# Patient Record
Sex: FEMALE | Race: WHITE | NOT HISPANIC OR LATINO | Employment: FULL TIME | URBAN - METROPOLITAN AREA
[De-identification: names, ages, dates, MRNs, and addresses within clinical notes are randomized per-mention and may not be internally consistent; named-entity substitution may affect disease eponyms.]

---

## 2021-08-17 ENCOUNTER — OFFICE VISIT (OUTPATIENT)
Dept: FAMILY MEDICINE CLINIC | Facility: CLINIC | Age: 31
End: 2021-08-17

## 2021-08-17 VITALS
BODY MASS INDEX: 45.99 KG/M2 | OXYGEN SATURATION: 96 % | HEIGHT: 67 IN | SYSTOLIC BLOOD PRESSURE: 136 MMHG | WEIGHT: 293 LBS | DIASTOLIC BLOOD PRESSURE: 74 MMHG | HEART RATE: 74 BPM | RESPIRATION RATE: 16 BRPM

## 2021-08-17 DIAGNOSIS — IMO0001 HORMONAL IMBALANCE IN TRANSGENDER PATIENT: Primary | ICD-10-CM

## 2021-08-17 PROBLEM — E66.813 CLASS 3 SEVERE OBESITY DUE TO EXCESS CALORIES WITHOUT SERIOUS COMORBIDITY IN ADULT: Status: ACTIVE | Noted: 2021-08-17

## 2021-08-17 PROBLEM — F51.4 SLEEP TERROR DISORDER: Status: ACTIVE | Noted: 2021-08-17

## 2021-08-17 PROBLEM — J30.9 ALLERGIC RHINITIS: Status: ACTIVE | Noted: 2021-08-17

## 2021-08-17 PROBLEM — E66.01 MORBID OBESITY: Status: ACTIVE | Noted: 2021-08-17

## 2021-08-17 PROBLEM — F43.10 POSTTRAUMATIC STRESS DISORDER: Status: ACTIVE | Noted: 2021-08-17

## 2021-08-17 PROBLEM — J30.1 SEASONAL ALLERGIC RHINITIS DUE TO POLLEN: Status: ACTIVE | Noted: 2021-08-17

## 2021-08-17 PROBLEM — F33.1 MAJOR DEPRESSIVE DISORDER, RECURRENT EPISODE, MODERATE DEGREE: Status: ACTIVE | Noted: 2021-08-17

## 2021-08-17 PROBLEM — F41.1 GENERALIZED ANXIETY DISORDER: Status: ACTIVE | Noted: 2021-08-17

## 2021-08-17 PROBLEM — J45.909 ASTHMA: Status: ACTIVE | Noted: 2021-08-17

## 2021-08-17 PROCEDURE — 99203 OFFICE O/P NEW LOW 30 MIN: CPT | Performed by: FAMILY MEDICINE

## 2021-08-17 RX ORDER — ALBUTEROL SULFATE 2.5 MG/3ML
SOLUTION RESPIRATORY (INHALATION)
COMMUNITY
Start: 2021-05-13 | End: 2022-06-23

## 2021-08-17 RX ORDER — HYDROCODONE BITARTRATE AND ACETAMINOPHEN 5; 325 MG/1; MG/1
TABLET ORAL
COMMUNITY
Start: 2021-07-07 | End: 2021-08-17

## 2021-08-17 RX ORDER — GUAIFENESIN 600 MG/1
TABLET, EXTENDED RELEASE ORAL
COMMUNITY
Start: 2021-05-10 | End: 2022-02-22

## 2021-08-17 RX ORDER — TRAZODONE HYDROCHLORIDE 150 MG/1
TABLET ORAL
COMMUNITY
Start: 2021-04-23 | End: 2021-11-17

## 2021-08-17 RX ORDER — AZITHROMYCIN 250 MG/1
TABLET, FILM COATED ORAL
COMMUNITY
Start: 2021-05-27 | End: 2021-08-17

## 2021-08-17 RX ORDER — BUPROPION HYDROCHLORIDE 150 MG/1
TABLET ORAL
COMMUNITY
Start: 2021-05-10 | End: 2021-11-17

## 2021-08-17 RX ORDER — METHYLPREDNISOLONE 4 MG/1
TABLET ORAL
COMMUNITY
Start: 2021-05-10 | End: 2021-08-17

## 2021-08-17 RX ORDER — TESTOSTERONE CYPIONATE 200 MG/ML
INJECTION, SOLUTION INTRAMUSCULAR
Qty: 10 ML | Refills: 1 | Status: SHIPPED | OUTPATIENT
Start: 2021-08-17 | End: 2021-08-20 | Stop reason: SDUPTHER

## 2021-08-17 RX ORDER — PREDNISONE 1 MG/1
TABLET ORAL
COMMUNITY
Start: 2021-07-21 | End: 2021-08-17

## 2021-08-17 RX ORDER — TIOTROPIUM BROMIDE INHALATION SPRAY 3.12 UG/1
SPRAY, METERED RESPIRATORY (INHALATION)
COMMUNITY
Start: 2021-05-27 | End: 2021-08-17

## 2021-08-17 RX ORDER — PRAZOSIN HYDROCHLORIDE 2 MG/1
CAPSULE ORAL
COMMUNITY
Start: 2021-07-21 | End: 2021-11-17

## 2021-08-17 RX ORDER — NORETHINDRONE ACETATE AND ETHINYL ESTRADIOL, ETHINYL ESTRADIOL AND FERROUS FUMARATE 1MG-10(24)
KIT ORAL
COMMUNITY
Start: 2021-07-26

## 2021-08-17 RX ORDER — MELATONIN 10 MG
10 CAPSULE ORAL
COMMUNITY
Start: 2021-04-23

## 2021-08-17 RX ORDER — SERTRALINE HYDROCHLORIDE 100 MG/1
TABLET, FILM COATED ORAL
COMMUNITY
Start: 2021-05-13 | End: 2021-11-17 | Stop reason: SDUPTHER

## 2021-08-17 RX ORDER — HYDROXYZINE 50 MG/1
TABLET, FILM COATED ORAL
COMMUNITY
Start: 2021-05-13 | End: 2021-11-17 | Stop reason: SDUPTHER

## 2021-08-20 DIAGNOSIS — IMO0001 HORMONAL IMBALANCE IN TRANSGENDER PATIENT: ICD-10-CM

## 2021-08-20 RX ORDER — TESTOSTERONE CYPIONATE 200 MG/ML
INJECTION, SOLUTION INTRAMUSCULAR
Qty: 6 ML | Refills: 0 | Status: SHIPPED | OUTPATIENT
Start: 2021-08-20 | End: 2022-01-24 | Stop reason: SDUPTHER

## 2021-09-14 ENCOUNTER — TRANSCRIBE ORDERS (OUTPATIENT)
Dept: PHYSICAL THERAPY | Facility: CLINIC | Age: 31
End: 2021-09-14

## 2021-09-14 DIAGNOSIS — M79.644 PAIN OF RIGHT THUMB: Primary | ICD-10-CM

## 2021-09-28 ENCOUNTER — TREATMENT (OUTPATIENT)
Dept: PHYSICAL THERAPY | Facility: CLINIC | Age: 31
End: 2021-09-28

## 2021-09-28 DIAGNOSIS — M79.644 THUMB PAIN, RIGHT: Primary | ICD-10-CM

## 2021-09-28 PROCEDURE — 97750 PHYSICAL PERFORMANCE TEST: CPT | Performed by: PHYSICAL THERAPIST

## 2021-11-17 ENCOUNTER — OFFICE VISIT (OUTPATIENT)
Dept: FAMILY MEDICINE CLINIC | Facility: CLINIC | Age: 31
End: 2021-11-17

## 2021-11-17 VITALS
DIASTOLIC BLOOD PRESSURE: 78 MMHG | HEIGHT: 67 IN | RESPIRATION RATE: 18 BRPM | SYSTOLIC BLOOD PRESSURE: 128 MMHG | HEART RATE: 78 BPM | WEIGHT: 293 LBS | OXYGEN SATURATION: 98 % | BODY MASS INDEX: 45.99 KG/M2

## 2021-11-17 DIAGNOSIS — IMO0001 HORMONAL IMBALANCE IN TRANSGENDER PATIENT: Primary | ICD-10-CM

## 2021-11-17 DIAGNOSIS — F41.1 GENERALIZED ANXIETY DISORDER: ICD-10-CM

## 2021-11-17 DIAGNOSIS — Z11.59 ENCOUNTER FOR HEPATITIS C SCREENING TEST FOR LOW RISK PATIENT: ICD-10-CM

## 2021-11-17 DIAGNOSIS — E66.01 CLASS 3 SEVERE OBESITY DUE TO EXCESS CALORIES WITHOUT SERIOUS COMORBIDITY WITH BODY MASS INDEX (BMI) OF 50.0 TO 59.9 IN ADULT (HCC): ICD-10-CM

## 2021-11-17 DIAGNOSIS — F33.1 MAJOR DEPRESSIVE DISORDER, RECURRENT EPISODE, MODERATE DEGREE (HCC): ICD-10-CM

## 2021-11-17 DIAGNOSIS — Z51.81 THERAPEUTIC DRUG MONITORING: ICD-10-CM

## 2021-11-17 DIAGNOSIS — F64.9 GENDER DYSPHORIA: ICD-10-CM

## 2021-11-17 PROBLEM — J30.9 ALLERGIC RHINITIS: Status: ACTIVE | Noted: 2021-10-12

## 2021-11-17 PROBLEM — G44.209 TENSION-TYPE HEADACHE: Status: ACTIVE | Noted: 2021-11-17

## 2021-11-17 PROCEDURE — 99214 OFFICE O/P EST MOD 30 MIN: CPT | Performed by: FAMILY MEDICINE

## 2021-11-17 RX ORDER — PRAZOSIN HYDROCHLORIDE 2 MG/1
2 CAPSULE ORAL NIGHTLY
Qty: 90 CAPSULE | Refills: 3 | Status: SHIPPED | OUTPATIENT
Start: 2021-11-17 | End: 2022-12-05

## 2021-11-17 RX ORDER — SERTRALINE HYDROCHLORIDE 100 MG/1
100 TABLET, FILM COATED ORAL DAILY
Qty: 90 TABLET | Refills: 3 | Status: SHIPPED | OUTPATIENT
Start: 2021-11-17 | End: 2022-09-26

## 2021-11-17 RX ORDER — TRAZODONE HYDROCHLORIDE 50 MG/1
50 TABLET ORAL NIGHTLY
Qty: 90 TABLET | Refills: 3 | Status: SHIPPED | OUTPATIENT
Start: 2021-11-17 | End: 2022-11-28

## 2021-11-17 RX ORDER — BUPROPION HYDROCHLORIDE 150 MG/1
150 TABLET ORAL EVERY MORNING
Qty: 90 TABLET | Refills: 3 | Status: SHIPPED | OUTPATIENT
Start: 2021-11-17 | End: 2022-12-05

## 2021-11-17 RX ORDER — HYDROXYZINE 50 MG/1
50 TABLET, FILM COATED ORAL EVERY 8 HOURS PRN
Qty: 90 TABLET | Refills: 3 | Status: SHIPPED | OUTPATIENT
Start: 2021-11-17

## 2021-11-18 LAB
ALBUMIN SERPL-MCNC: 4.3 G/DL (ref 3.8–4.8)
ALBUMIN/GLOB SERPL: 1.7 {RATIO} (ref 1.2–2.2)
ALP SERPL-CCNC: 73 IU/L (ref 44–121)
ALT SERPL-CCNC: 24 IU/L (ref 0–32)
AST SERPL-CCNC: 23 IU/L (ref 0–40)
BILIRUB SERPL-MCNC: <0.2 MG/DL (ref 0–1.2)
BUN SERPL-MCNC: 9 MG/DL (ref 6–20)
BUN/CREAT SERPL: 11 (ref 9–23)
CALCIUM SERPL-MCNC: 9.2 MG/DL (ref 8.7–10.2)
CHLORIDE SERPL-SCNC: 102 MMOL/L (ref 96–106)
CHOLEST SERPL-MCNC: 230 MG/DL (ref 100–199)
CO2 SERPL-SCNC: 24 MMOL/L (ref 20–29)
CREAT SERPL-MCNC: 0.81 MG/DL (ref 0.57–1)
ERYTHROCYTE [DISTWIDTH] IN BLOOD BY AUTOMATED COUNT: 12.9 % (ref 11.7–15.4)
GLOBULIN SER CALC-MCNC: 2.5 G/DL (ref 1.5–4.5)
GLUCOSE SERPL-MCNC: 132 MG/DL (ref 65–99)
HCT VFR BLD AUTO: 42 % (ref 34–46.6)
HCV AB S/CO SERPL IA: <0.1 S/CO RATIO (ref 0–0.9)
HDLC SERPL-MCNC: 31 MG/DL
HGB BLD-MCNC: 13.8 G/DL (ref 11.1–15.9)
LDLC SERPL CALC-MCNC: 156 MG/DL (ref 0–99)
LDLC/HDLC SERPL: 5 RATIO (ref 0–3.2)
MCH RBC QN AUTO: 28.6 PG (ref 26.6–33)
MCHC RBC AUTO-ENTMCNC: 32.9 G/DL (ref 31.5–35.7)
MCV RBC AUTO: 87 FL (ref 79–97)
PLATELET # BLD AUTO: 229 X10E3/UL (ref 150–450)
POTASSIUM SERPL-SCNC: 4.2 MMOL/L (ref 3.5–5.2)
PROT SERPL-MCNC: 6.8 G/DL (ref 6–8.5)
RBC # BLD AUTO: 4.82 X10E6/UL (ref 3.77–5.28)
SODIUM SERPL-SCNC: 141 MMOL/L (ref 134–144)
TESTOST SERPL-MCNC: 289 NG/DL (ref 8–60)
TRIGL SERPL-MCNC: 230 MG/DL (ref 0–149)
VLDLC SERPL CALC-MCNC: 43 MG/DL (ref 5–40)
WBC # BLD AUTO: 9.2 X10E3/UL (ref 3.4–10.8)

## 2022-01-24 DIAGNOSIS — IMO0001 HORMONAL IMBALANCE IN TRANSGENDER PATIENT: ICD-10-CM

## 2022-01-24 RX ORDER — TESTOSTERONE CYPIONATE 200 MG/ML
INJECTION, SOLUTION INTRAMUSCULAR
Qty: 6 ML | Refills: 0 | Status: SHIPPED | OUTPATIENT
Start: 2022-01-24 | End: 2022-02-22 | Stop reason: SDUPTHER

## 2022-02-22 ENCOUNTER — OFFICE VISIT (OUTPATIENT)
Dept: FAMILY MEDICINE CLINIC | Facility: CLINIC | Age: 32
End: 2022-02-22

## 2022-02-22 VITALS
SYSTOLIC BLOOD PRESSURE: 118 MMHG | BODY MASS INDEX: 45.99 KG/M2 | HEART RATE: 76 BPM | OXYGEN SATURATION: 96 % | WEIGHT: 293 LBS | HEIGHT: 67 IN | DIASTOLIC BLOOD PRESSURE: 82 MMHG

## 2022-02-22 DIAGNOSIS — IMO0001 HORMONAL IMBALANCE IN TRANSGENDER PATIENT: Primary | ICD-10-CM

## 2022-02-22 DIAGNOSIS — F64.9 GENDER DYSPHORIA: ICD-10-CM

## 2022-02-22 DIAGNOSIS — J01.90 ACUTE VIRAL SINUSITIS: ICD-10-CM

## 2022-02-22 DIAGNOSIS — B97.89 ACUTE VIRAL SINUSITIS: ICD-10-CM

## 2022-02-22 PROCEDURE — 99214 OFFICE O/P EST MOD 30 MIN: CPT | Performed by: FAMILY MEDICINE

## 2022-02-22 RX ORDER — OXYMETAZOLINE HYDROCHLORIDE 0.05 G/100ML
2 SPRAY NASAL 2 TIMES DAILY
Qty: 30 ML | Refills: 0 | Status: SHIPPED | OUTPATIENT
Start: 2022-02-22

## 2022-02-22 RX ORDER — PSEUDOEPHEDRINE HYDROCHLORIDE 60 MG/1
60 TABLET, FILM COATED ORAL EVERY 4 HOURS PRN
Qty: 30 TABLET | Refills: 0 | Status: SHIPPED | OUTPATIENT
Start: 2022-02-22

## 2022-02-22 RX ORDER — TESTOSTERONE CYPIONATE 200 MG/ML
INJECTION, SOLUTION INTRAMUSCULAR
Qty: 6 ML | Refills: 2 | Status: SHIPPED | OUTPATIENT
Start: 2022-02-22 | End: 2022-05-24 | Stop reason: SDUPTHER

## 2022-02-23 LAB
ALBUMIN SERPL-MCNC: 4.3 G/DL (ref 3.8–4.8)
ALBUMIN/GLOB SERPL: 1.5 {RATIO} (ref 1.2–2.2)
ALP SERPL-CCNC: 76 IU/L (ref 44–121)
ALT SERPL-CCNC: 25 IU/L (ref 0–32)
AST SERPL-CCNC: 22 IU/L (ref 0–40)
BILIRUB SERPL-MCNC: <0.2 MG/DL (ref 0–1.2)
BUN SERPL-MCNC: 13 MG/DL (ref 6–20)
BUN/CREAT SERPL: 15 (ref 9–23)
CALCIUM SERPL-MCNC: 9.1 MG/DL (ref 8.7–10.2)
CHLORIDE SERPL-SCNC: 103 MMOL/L (ref 96–106)
CHOLEST SERPL-MCNC: 220 MG/DL (ref 100–199)
CO2 SERPL-SCNC: 22 MMOL/L (ref 20–29)
CREAT SERPL-MCNC: 0.84 MG/DL (ref 0.57–1)
ERYTHROCYTE [DISTWIDTH] IN BLOOD BY AUTOMATED COUNT: 13.5 % (ref 11.7–15.4)
GLOBULIN SER CALC-MCNC: 2.8 G/DL (ref 1.5–4.5)
GLUCOSE SERPL-MCNC: 169 MG/DL (ref 65–99)
HCT VFR BLD AUTO: 43.4 % (ref 34–46.6)
HDLC SERPL-MCNC: 35 MG/DL
HGB BLD-MCNC: 14.2 G/DL (ref 11.1–15.9)
LDLC SERPL CALC-MCNC: 143 MG/DL (ref 0–99)
LDLC/HDLC SERPL: 4.1 RATIO (ref 0–3.2)
MCH RBC QN AUTO: 28.5 PG (ref 26.6–33)
MCHC RBC AUTO-ENTMCNC: 32.7 G/DL (ref 31.5–35.7)
MCV RBC AUTO: 87 FL (ref 79–97)
PLATELET # BLD AUTO: 205 X10E3/UL (ref 150–450)
POTASSIUM SERPL-SCNC: 4.4 MMOL/L (ref 3.5–5.2)
PROT SERPL-MCNC: 7.1 G/DL (ref 6–8.5)
RBC # BLD AUTO: 4.98 X10E6/UL (ref 3.77–5.28)
SODIUM SERPL-SCNC: 140 MMOL/L (ref 134–144)
TESTOST SERPL-MCNC: 220 NG/DL (ref 8–60)
TRIGL SERPL-MCNC: 228 MG/DL (ref 0–149)
VLDLC SERPL CALC-MCNC: 42 MG/DL (ref 5–40)
WBC # BLD AUTO: 11.8 X10E3/UL (ref 3.4–10.8)

## 2022-05-24 DIAGNOSIS — E34.9 HORMONE IMBALANCE: Primary | ICD-10-CM

## 2022-05-24 DIAGNOSIS — F64.0 GENDER DYSPHORIA IN ADULT: ICD-10-CM

## 2022-05-24 RX ORDER — TESTOSTERONE CYPIONATE 200 MG/ML
INJECTION, SOLUTION INTRAMUSCULAR
Qty: 6 ML | Refills: 2 | Status: SHIPPED | OUTPATIENT
Start: 2022-05-24 | End: 2022-08-30 | Stop reason: SDUPTHER

## 2022-06-23 DIAGNOSIS — J45.20 MILD INTERMITTENT ASTHMA WITHOUT COMPLICATION: Primary | ICD-10-CM

## 2022-06-23 RX ORDER — ALBUTEROL SULFATE 90 UG/1
2 AEROSOL, METERED RESPIRATORY (INHALATION) EVERY 4 HOURS PRN
Qty: 18 G | Refills: 0 | Status: SHIPPED | OUTPATIENT
Start: 2022-06-23

## 2022-08-30 ENCOUNTER — OFFICE VISIT (OUTPATIENT)
Dept: FAMILY MEDICINE CLINIC | Facility: CLINIC | Age: 32
End: 2022-08-30

## 2022-08-30 VITALS
OXYGEN SATURATION: 96 % | DIASTOLIC BLOOD PRESSURE: 80 MMHG | RESPIRATION RATE: 20 BRPM | SYSTOLIC BLOOD PRESSURE: 120 MMHG | HEART RATE: 74 BPM

## 2022-08-30 DIAGNOSIS — E34.9 HORMONE IMBALANCE: ICD-10-CM

## 2022-08-30 DIAGNOSIS — H10.31 ACUTE CONJUNCTIVITIS OF RIGHT EYE, UNSPECIFIED ACUTE CONJUNCTIVITIS TYPE: Primary | ICD-10-CM

## 2022-08-30 DIAGNOSIS — J06.9 VIRAL URI: ICD-10-CM

## 2022-08-30 DIAGNOSIS — F64.0 GENDER DYSPHORIA IN ADULT: ICD-10-CM

## 2022-08-30 PROBLEM — S42.251A CLOSED FRACTURE OF GREATER TUBEROSITY OF RIGHT HUMERUS: Status: ACTIVE | Noted: 2022-05-04

## 2022-08-30 PROBLEM — M75.02 ADHESIVE CAPSULITIS OF LEFT SHOULDER: Status: ACTIVE | Noted: 2022-06-10

## 2022-08-30 PROBLEM — M77.9 CAPSULITIS: Status: ACTIVE | Noted: 2022-08-30

## 2022-08-30 PROCEDURE — 99214 OFFICE O/P EST MOD 30 MIN: CPT | Performed by: FAMILY MEDICINE

## 2022-08-30 RX ORDER — TESTOSTERONE CYPIONATE 200 MG/ML
INJECTION, SOLUTION INTRAMUSCULAR
Qty: 6 ML | Refills: 2 | Status: SHIPPED | OUTPATIENT
Start: 2022-08-30 | End: 2022-08-30 | Stop reason: SDUPTHER

## 2022-08-30 RX ORDER — ERYTHROMYCIN 5 MG/G
OINTMENT OPHTHALMIC NIGHTLY
Qty: 3.5 G | Refills: 1 | Status: SHIPPED | OUTPATIENT
Start: 2022-08-30

## 2022-08-30 RX ORDER — GABAPENTIN 300 MG/1
300 CAPSULE ORAL NIGHTLY
COMMUNITY
Start: 2022-05-04 | End: 2023-07-27

## 2022-08-30 RX ORDER — TESTOSTERONE CYPIONATE 200 MG/ML
INJECTION, SOLUTION INTRAMUSCULAR
Qty: 6 ML | Refills: 2 | Status: SHIPPED | OUTPATIENT
Start: 2022-08-30 | End: 2023-03-12 | Stop reason: SDUPTHER

## 2022-09-25 DIAGNOSIS — F41.1 GENERALIZED ANXIETY DISORDER: ICD-10-CM

## 2022-09-25 DIAGNOSIS — F33.1 MAJOR DEPRESSIVE DISORDER, RECURRENT EPISODE, MODERATE DEGREE: ICD-10-CM

## 2022-09-26 RX ORDER — SERTRALINE HYDROCHLORIDE 100 MG/1
TABLET, FILM COATED ORAL
Qty: 90 TABLET | Refills: 1 | Status: SHIPPED | OUTPATIENT
Start: 2022-09-26

## 2022-11-24 DIAGNOSIS — F33.1 MAJOR DEPRESSIVE DISORDER, RECURRENT EPISODE, MODERATE DEGREE: ICD-10-CM

## 2022-11-24 DIAGNOSIS — F41.1 GENERALIZED ANXIETY DISORDER: ICD-10-CM

## 2022-11-28 RX ORDER — TRAZODONE HYDROCHLORIDE 50 MG/1
TABLET ORAL
Qty: 30 TABLET | Refills: 0 | Status: SHIPPED | OUTPATIENT
Start: 2022-11-28 | End: 2023-02-06

## 2022-12-04 DIAGNOSIS — F33.1 MAJOR DEPRESSIVE DISORDER, RECURRENT EPISODE, MODERATE DEGREE: ICD-10-CM

## 2022-12-04 DIAGNOSIS — F41.1 GENERALIZED ANXIETY DISORDER: ICD-10-CM

## 2022-12-05 RX ORDER — BUPROPION HYDROCHLORIDE 150 MG/1
TABLET ORAL
Qty: 30 TABLET | Refills: 3 | Status: SHIPPED | OUTPATIENT
Start: 2022-12-05

## 2022-12-05 RX ORDER — PRAZOSIN HYDROCHLORIDE 2 MG/1
CAPSULE ORAL
Qty: 30 CAPSULE | Refills: 3 | Status: SHIPPED | OUTPATIENT
Start: 2022-12-05

## 2023-02-04 DIAGNOSIS — F33.1 MAJOR DEPRESSIVE DISORDER, RECURRENT EPISODE, MODERATE DEGREE: ICD-10-CM

## 2023-02-04 DIAGNOSIS — F41.1 GENERALIZED ANXIETY DISORDER: ICD-10-CM

## 2023-02-06 RX ORDER — TRAZODONE HYDROCHLORIDE 50 MG/1
TABLET ORAL
Qty: 30 TABLET | Refills: 0 | Status: SHIPPED | OUTPATIENT
Start: 2023-02-06 | End: 2023-03-09

## 2023-03-09 DIAGNOSIS — F33.1 MAJOR DEPRESSIVE DISORDER, RECURRENT EPISODE, MODERATE DEGREE: ICD-10-CM

## 2023-03-09 DIAGNOSIS — F41.1 GENERALIZED ANXIETY DISORDER: ICD-10-CM

## 2023-03-09 RX ORDER — TRAZODONE HYDROCHLORIDE 50 MG/1
TABLET ORAL
Qty: 15 TABLET | Refills: 0 | Status: SHIPPED | OUTPATIENT
Start: 2023-03-09 | End: 2023-03-27

## 2023-03-12 DIAGNOSIS — E34.9 HORMONE IMBALANCE: ICD-10-CM

## 2023-03-12 DIAGNOSIS — F64.0 GENDER DYSPHORIA IN ADULT: ICD-10-CM

## 2023-03-13 DIAGNOSIS — F64.0 GENDER DYSPHORIA IN ADULT: ICD-10-CM

## 2023-03-13 DIAGNOSIS — E34.9 HORMONE IMBALANCE: ICD-10-CM

## 2023-03-13 RX ORDER — TESTOSTERONE CYPIONATE 200 MG/ML
INJECTION, SOLUTION INTRAMUSCULAR
Qty: 6 ML | Refills: 0 | Status: SHIPPED | OUTPATIENT
Start: 2023-03-13

## 2023-03-13 RX ORDER — TESTOSTERONE CYPIONATE 200 MG/ML
INJECTION, SOLUTION INTRAMUSCULAR
Qty: 6 ML | Refills: 0 | Status: SHIPPED | OUTPATIENT
Start: 2023-03-13 | End: 2023-03-13 | Stop reason: SDUPTHER

## 2023-03-25 DIAGNOSIS — F33.1 MAJOR DEPRESSIVE DISORDER, RECURRENT EPISODE, MODERATE DEGREE: ICD-10-CM

## 2023-03-25 DIAGNOSIS — F41.1 GENERALIZED ANXIETY DISORDER: ICD-10-CM

## 2023-03-27 RX ORDER — TRAZODONE HYDROCHLORIDE 50 MG/1
TABLET ORAL
Qty: 15 TABLET | Refills: 0 | Status: SHIPPED | OUTPATIENT
Start: 2023-03-27

## 2023-04-10 DIAGNOSIS — F64.0 GENDER DYSPHORIA IN ADULT: ICD-10-CM

## 2023-04-10 DIAGNOSIS — E34.9 HORMONE IMBALANCE: ICD-10-CM

## 2023-04-10 RX ORDER — TESTOSTERONE CYPIONATE 200 MG/ML
INJECTION, SOLUTION INTRAMUSCULAR
Qty: 4 ML | Refills: 0 | Status: SHIPPED | OUTPATIENT
Start: 2023-04-10 | End: 2023-04-17 | Stop reason: SDUPTHER

## 2023-04-17 ENCOUNTER — OFFICE VISIT (OUTPATIENT)
Dept: FAMILY MEDICINE CLINIC | Facility: CLINIC | Age: 33
End: 2023-04-17
Payer: COMMERCIAL

## 2023-04-17 VITALS
SYSTOLIC BLOOD PRESSURE: 118 MMHG | RESPIRATION RATE: 20 BRPM | OXYGEN SATURATION: 96 % | DIASTOLIC BLOOD PRESSURE: 78 MMHG | HEART RATE: 73 BPM | WEIGHT: 293 LBS | BODY MASS INDEX: 52.47 KG/M2

## 2023-04-17 DIAGNOSIS — E34.9 HORMONE IMBALANCE: ICD-10-CM

## 2023-04-17 DIAGNOSIS — F33.1 MAJOR DEPRESSIVE DISORDER, RECURRENT EPISODE, MODERATE DEGREE: ICD-10-CM

## 2023-04-17 DIAGNOSIS — F64.0 GENDER DYSPHORIA IN ADULT: Primary | ICD-10-CM

## 2023-04-17 DIAGNOSIS — F41.1 GENERALIZED ANXIETY DISORDER: ICD-10-CM

## 2023-04-17 DIAGNOSIS — F51.04 PSYCHOPHYSIOLOGICAL INSOMNIA: ICD-10-CM

## 2023-04-17 PROCEDURE — 99214 OFFICE O/P EST MOD 30 MIN: CPT | Performed by: FAMILY MEDICINE

## 2023-04-17 RX ORDER — TESTOSTERONE CYPIONATE 200 MG/ML
INJECTION, SOLUTION INTRAMUSCULAR
Qty: 10 ML | Refills: 0 | Status: SHIPPED | OUTPATIENT
Start: 2023-04-17

## 2023-04-17 RX ORDER — TRAZODONE HYDROCHLORIDE 100 MG/1
100 TABLET ORAL NIGHTLY
Qty: 90 TABLET | Refills: 3 | Status: SHIPPED | OUTPATIENT
Start: 2023-04-17

## 2023-04-17 RX ORDER — ZOLPIDEM TARTRATE 5 MG/1
5 TABLET ORAL NIGHTLY PRN
Qty: 20 TABLET | Refills: 0 | Status: SHIPPED | OUTPATIENT
Start: 2023-04-17

## 2023-04-17 RX ORDER — PRAZOSIN HYDROCHLORIDE 2 MG/1
2 CAPSULE ORAL NIGHTLY
Qty: 90 CAPSULE | Refills: 3 | Status: SHIPPED | OUTPATIENT
Start: 2023-04-17

## 2023-04-17 RX ORDER — HYDROXYZINE 50 MG/1
50 TABLET, FILM COATED ORAL EVERY 8 HOURS PRN
Qty: 90 TABLET | Refills: 3 | Status: SHIPPED | OUTPATIENT
Start: 2023-04-17

## 2023-04-17 RX ORDER — SERTRALINE HYDROCHLORIDE 100 MG/1
100 TABLET, FILM COATED ORAL DAILY
Qty: 90 TABLET | Refills: 3 | Status: SHIPPED | OUTPATIENT
Start: 2023-04-17

## 2023-04-17 RX ORDER — BUPROPION HYDROCHLORIDE 150 MG/1
150 TABLET ORAL EVERY MORNING
Qty: 90 TABLET | Refills: 3 | Status: SHIPPED | OUTPATIENT
Start: 2023-04-17

## 2023-04-18 LAB
ALBUMIN SERPL-MCNC: 4.3 G/DL (ref 3.5–5.2)
ALBUMIN/GLOB SERPL: 2 G/DL
ALP SERPL-CCNC: 64 U/L (ref 39–117)
ALT SERPL-CCNC: 31 U/L (ref 1–33)
AST SERPL-CCNC: 25 U/L (ref 1–32)
BILIRUB SERPL-MCNC: 0.4 MG/DL (ref 0–1.2)
BUN SERPL-MCNC: 12 MG/DL (ref 6–20)
BUN/CREAT SERPL: 13.6 (ref 7–25)
CALCIUM SERPL-MCNC: 8.9 MG/DL (ref 8.6–10.5)
CHLORIDE SERPL-SCNC: 104 MMOL/L (ref 98–107)
CHOLEST SERPL-MCNC: 181 MG/DL (ref 0–200)
CO2 SERPL-SCNC: 25.6 MMOL/L (ref 22–29)
CREAT SERPL-MCNC: 0.88 MG/DL (ref 0.57–1)
EGFRCR SERPLBLD CKD-EPI 2021: 88.6 ML/MIN/1.73
ERYTHROCYTE [DISTWIDTH] IN BLOOD BY AUTOMATED COUNT: 12.3 % (ref 12.3–15.4)
GLOBULIN SER CALC-MCNC: 2.1 GM/DL
GLUCOSE SERPL-MCNC: 157 MG/DL (ref 65–99)
HCT VFR BLD AUTO: 46.4 % (ref 34–46.6)
HDLC SERPL-MCNC: 29 MG/DL (ref 40–60)
HGB BLD-MCNC: 14.8 G/DL (ref 12–15.9)
LDLC SERPL CALC-MCNC: 120 MG/DL (ref 0–100)
LDLC/HDLC SERPL: 4.01 {RATIO}
MCH RBC QN AUTO: 28 PG (ref 26.6–33)
MCHC RBC AUTO-ENTMCNC: 31.9 G/DL (ref 31.5–35.7)
MCV RBC AUTO: 87.9 FL (ref 79–97)
PLATELET # BLD AUTO: 182 10*3/MM3 (ref 140–450)
POTASSIUM SERPL-SCNC: 4.2 MMOL/L (ref 3.5–5.2)
PROT SERPL-MCNC: 6.4 G/DL (ref 6–8.5)
RBC # BLD AUTO: 5.28 10*6/MM3 (ref 3.77–5.28)
SODIUM SERPL-SCNC: 140 MMOL/L (ref 136–145)
TESTOST SERPL-MCNC: 612 NG/DL (ref 8–60)
TRIGL SERPL-MCNC: 178 MG/DL (ref 0–150)
VLDLC SERPL CALC-MCNC: 32 MG/DL (ref 5–40)
WBC # BLD AUTO: 7.37 10*3/MM3 (ref 3.4–10.8)

## 2023-05-22 ENCOUNTER — OFFICE VISIT (OUTPATIENT)
Dept: FAMILY MEDICINE CLINIC | Facility: CLINIC | Age: 33
End: 2023-05-22
Payer: COMMERCIAL

## 2023-05-22 VITALS
RESPIRATION RATE: 19 BRPM | BODY MASS INDEX: 53.09 KG/M2 | OXYGEN SATURATION: 96 % | DIASTOLIC BLOOD PRESSURE: 78 MMHG | SYSTOLIC BLOOD PRESSURE: 126 MMHG | WEIGHT: 293 LBS | HEART RATE: 67 BPM

## 2023-05-22 DIAGNOSIS — F51.04 PSYCHOPHYSIOLOGICAL INSOMNIA: ICD-10-CM

## 2023-05-22 DIAGNOSIS — L70.9 ACNE, UNSPECIFIED ACNE TYPE: Primary | ICD-10-CM

## 2023-05-22 PROCEDURE — 99213 OFFICE O/P EST LOW 20 MIN: CPT | Performed by: FAMILY MEDICINE

## 2023-05-22 RX ORDER — CLINDAMYCIN PHOSPHATE 10 MG/G
1 GEL TOPICAL 2 TIMES DAILY
Qty: 60 G | Refills: 1 | Status: SHIPPED | OUTPATIENT
Start: 2023-05-22

## 2023-07-31 DIAGNOSIS — F51.04 PSYCHOPHYSIOLOGICAL INSOMNIA: ICD-10-CM

## 2023-07-31 DIAGNOSIS — F33.1 MAJOR DEPRESSIVE DISORDER, RECURRENT EPISODE, MODERATE DEGREE: ICD-10-CM

## 2023-07-31 DIAGNOSIS — F41.1 GENERALIZED ANXIETY DISORDER: ICD-10-CM

## 2023-07-31 RX ORDER — PRAZOSIN HYDROCHLORIDE 2 MG/1
2 CAPSULE ORAL NIGHTLY
Qty: 90 CAPSULE | Refills: 3 | Status: SHIPPED | OUTPATIENT
Start: 2023-07-31

## 2023-07-31 RX ORDER — HYDROXYZINE 50 MG/1
50 TABLET, FILM COATED ORAL EVERY 8 HOURS PRN
Qty: 90 TABLET | Refills: 3 | Status: SHIPPED | OUTPATIENT
Start: 2023-07-31

## 2023-07-31 RX ORDER — TRAZODONE HYDROCHLORIDE 100 MG/1
100 TABLET ORAL NIGHTLY
Qty: 90 TABLET | Refills: 3 | Status: SHIPPED | OUTPATIENT
Start: 2023-07-31

## 2023-07-31 RX ORDER — BUPROPION HYDROCHLORIDE 150 MG/1
150 TABLET ORAL EVERY MORNING
Qty: 90 TABLET | Refills: 3 | Status: SHIPPED | OUTPATIENT
Start: 2023-07-31

## 2023-07-31 RX ORDER — SERTRALINE HYDROCHLORIDE 100 MG/1
100 TABLET, FILM COATED ORAL DAILY
Qty: 90 TABLET | Refills: 3 | Status: SHIPPED | OUTPATIENT
Start: 2023-07-31

## 2023-10-02 ENCOUNTER — OFFICE VISIT (OUTPATIENT)
Dept: FAMILY MEDICINE CLINIC | Facility: CLINIC | Age: 33
End: 2023-10-02
Payer: COMMERCIAL

## 2023-10-02 VITALS
BODY MASS INDEX: 45.99 KG/M2 | DIASTOLIC BLOOD PRESSURE: 84 MMHG | HEIGHT: 67 IN | WEIGHT: 293 LBS | SYSTOLIC BLOOD PRESSURE: 126 MMHG | HEART RATE: 62 BPM | RESPIRATION RATE: 18 BRPM | OXYGEN SATURATION: 95 %

## 2023-10-02 DIAGNOSIS — F64.0 GENDER DYSPHORIA IN ADULT: ICD-10-CM

## 2023-10-02 DIAGNOSIS — H69.93 EUSTACHIAN TUBE DYSFUNCTION, BILATERAL: Primary | ICD-10-CM

## 2023-10-02 DIAGNOSIS — E34.9 HORMONE IMBALANCE: ICD-10-CM

## 2023-10-02 PROCEDURE — 99213 OFFICE O/P EST LOW 20 MIN: CPT | Performed by: FAMILY MEDICINE

## 2023-10-02 RX ORDER — FLUTICASONE PROPIONATE 50 MCG
2 SPRAY, SUSPENSION (ML) NASAL DAILY
COMMUNITY

## 2023-10-02 RX ORDER — OXYMETAZOLINE HYDROCHLORIDE 0.05 G/100ML
2 SPRAY NASAL 2 TIMES DAILY
Qty: 30 ML | Refills: 0 | Status: SHIPPED | OUTPATIENT
Start: 2023-10-02

## 2023-10-02 RX ORDER — TESTOSTERONE CYPIONATE 200 MG/ML
INJECTION, SOLUTION INTRAMUSCULAR
Qty: 10 ML | Refills: 0 | Status: SHIPPED | OUTPATIENT
Start: 2023-10-02

## 2023-10-02 RX ORDER — LORATADINE 10 MG/1
CAPSULE, LIQUID FILLED ORAL
COMMUNITY

## 2023-11-27 ENCOUNTER — OFFICE VISIT (OUTPATIENT)
Dept: FAMILY MEDICINE CLINIC | Facility: CLINIC | Age: 33
End: 2023-11-27
Payer: COMMERCIAL

## 2023-11-27 ENCOUNTER — HOSPITAL ENCOUNTER (OUTPATIENT)
Dept: GENERAL RADIOLOGY | Facility: HOSPITAL | Age: 33
Discharge: HOME | End: 2023-11-27
Admitting: FAMILY MEDICINE
Payer: COMMERCIAL

## 2023-11-27 VITALS
BODY MASS INDEX: 54.79 KG/M2 | OXYGEN SATURATION: 95 % | RESPIRATION RATE: 19 BRPM | SYSTOLIC BLOOD PRESSURE: 136 MMHG | HEART RATE: 85 BPM | DIASTOLIC BLOOD PRESSURE: 82 MMHG | HEIGHT: 67 IN

## 2023-11-27 DIAGNOSIS — M25.552 LEFT HIP PAIN: ICD-10-CM

## 2023-11-27 DIAGNOSIS — M25.552 LEFT HIP PAIN: Primary | ICD-10-CM

## 2023-11-27 PROBLEM — J30.2 SEASONAL ALLERGIES: Status: ACTIVE | Noted: 2023-11-27

## 2023-11-27 PROCEDURE — 73521 X-RAY EXAM HIPS BI 2 VIEWS: CPT

## 2023-11-27 PROCEDURE — 99213 OFFICE O/P EST LOW 20 MIN: CPT | Performed by: FAMILY MEDICINE

## 2023-11-27 RX ORDER — CYCLOBENZAPRINE HCL 5 MG
5 TABLET ORAL 3 TIMES DAILY PRN
Qty: 30 TABLET | Refills: 0 | Status: SHIPPED | OUTPATIENT
Start: 2023-11-27

## 2023-11-27 RX ORDER — CYCLOBENZAPRINE HCL 5 MG
5 TABLET ORAL 3 TIMES DAILY PRN
Qty: 30 TABLET | Refills: 0 | Status: SHIPPED | OUTPATIENT
Start: 2023-11-27 | End: 2023-11-27 | Stop reason: SDUPTHER

## 2024-01-06 DIAGNOSIS — F64.0 GENDER DYSPHORIA IN ADULT: ICD-10-CM

## 2024-01-06 DIAGNOSIS — J45.20 MILD INTERMITTENT ASTHMA WITHOUT COMPLICATION: ICD-10-CM

## 2024-01-06 DIAGNOSIS — M25.552 LEFT HIP PAIN: ICD-10-CM

## 2024-01-06 DIAGNOSIS — E34.9 HORMONE IMBALANCE: ICD-10-CM

## 2024-01-08 RX ORDER — CYCLOBENZAPRINE HCL 5 MG
5 TABLET ORAL 3 TIMES DAILY PRN
Qty: 30 TABLET | Refills: 2 | Status: SHIPPED | OUTPATIENT
Start: 2024-01-08

## 2024-01-08 RX ORDER — ALBUTEROL SULFATE 90 UG/1
2 AEROSOL, METERED RESPIRATORY (INHALATION) EVERY 4 HOURS PRN
Qty: 18 G | Refills: 5 | Status: SHIPPED | OUTPATIENT
Start: 2024-01-08

## 2024-01-08 RX ORDER — TESTOSTERONE CYPIONATE 200 MG/ML
INJECTION, SOLUTION INTRAMUSCULAR
Qty: 10 ML | Refills: 1 | Status: SHIPPED | OUTPATIENT
Start: 2024-01-08

## 2024-04-30 ENCOUNTER — OFFICE VISIT (OUTPATIENT)
Dept: FAMILY MEDICINE CLINIC | Facility: CLINIC | Age: 34
End: 2024-04-30
Payer: COMMERCIAL

## 2024-04-30 VITALS
HEIGHT: 67 IN | DIASTOLIC BLOOD PRESSURE: 88 MMHG | BODY MASS INDEX: 45.99 KG/M2 | SYSTOLIC BLOOD PRESSURE: 138 MMHG | WEIGHT: 293 LBS | OXYGEN SATURATION: 95 % | HEART RATE: 76 BPM | RESPIRATION RATE: 18 BRPM

## 2024-04-30 DIAGNOSIS — F41.1 GENERALIZED ANXIETY DISORDER: ICD-10-CM

## 2024-04-30 DIAGNOSIS — R41.840 ATTENTION DEFICIT: Primary | ICD-10-CM

## 2024-04-30 DIAGNOSIS — F64.0 GENDER DYSPHORIA IN ADULT: ICD-10-CM

## 2024-04-30 DIAGNOSIS — E34.9 HORMONE IMBALANCE: ICD-10-CM

## 2024-04-30 DIAGNOSIS — R06.83 LOUD SNORING: ICD-10-CM

## 2024-04-30 DIAGNOSIS — E66.01 CLASS 3 SEVERE OBESITY DUE TO EXCESS CALORIES WITHOUT SERIOUS COMORBIDITY WITH BODY MASS INDEX (BMI) OF 50.0 TO 59.9 IN ADULT: ICD-10-CM

## 2024-04-30 DIAGNOSIS — R40.0 DAYTIME SOMNOLENCE: ICD-10-CM

## 2024-04-30 DIAGNOSIS — F33.1 MAJOR DEPRESSIVE DISORDER, RECURRENT EPISODE, MODERATE DEGREE: ICD-10-CM

## 2024-04-30 DIAGNOSIS — Z51.81 THERAPEUTIC DRUG MONITORING: ICD-10-CM

## 2024-04-30 PROCEDURE — 99214 OFFICE O/P EST MOD 30 MIN: CPT | Performed by: FAMILY MEDICINE

## 2024-04-30 RX ORDER — HYDROXYZINE 50 MG/1
50 TABLET, FILM COATED ORAL 2 TIMES DAILY PRN
Qty: 180 TABLET | Refills: 3 | Status: SHIPPED | OUTPATIENT
Start: 2024-04-30

## 2024-05-05 LAB
ALBUMIN SERPL-MCNC: 4.3 G/DL (ref 3.9–4.9)
ALBUMIN/GLOB SERPL: 1.7 {RATIO} (ref 1.2–2.2)
ALP SERPL-CCNC: 70 IU/L (ref 44–121)
ALT SERPL-CCNC: 67 IU/L (ref 0–32)
AST SERPL-CCNC: 55 IU/L (ref 0–40)
BILIRUB SERPL-MCNC: 0.5 MG/DL (ref 0–1.2)
BUN SERPL-MCNC: 12 MG/DL (ref 6–20)
BUN/CREAT SERPL: 13 (ref 9–23)
CALCIUM SERPL-MCNC: 9.1 MG/DL (ref 8.7–10.2)
CHLORIDE SERPL-SCNC: 101 MMOL/L (ref 96–106)
CHOLEST SERPL-MCNC: 225 MG/DL (ref 100–199)
CO2 SERPL-SCNC: 23 MMOL/L (ref 20–29)
CREAT SERPL-MCNC: 0.9 MG/DL (ref 0.57–1)
EGFRCR SERPLBLD CKD-EPI 2021: 85 ML/MIN/1.73
ERYTHROCYTE [DISTWIDTH] IN BLOOD BY AUTOMATED COUNT: 13.4 % (ref 11.7–15.4)
GLOBULIN SER CALC-MCNC: 2.6 G/DL (ref 1.5–4.5)
GLUCOSE SERPL-MCNC: 128 MG/DL (ref 70–99)
HBA1C MFR BLD: 7 % (ref 4.8–5.6)
HCT VFR BLD AUTO: 48.5 % (ref 34–46.6)
HDLC SERPL-MCNC: 31 MG/DL
HGB BLD-MCNC: 15.9 G/DL (ref 11.1–15.9)
LDLC SERPL CALC-MCNC: 151 MG/DL (ref 0–99)
MCH RBC QN AUTO: 29.3 PG (ref 26.6–33)
MCHC RBC AUTO-ENTMCNC: 32.8 G/DL (ref 31.5–35.7)
MCV RBC AUTO: 90 FL (ref 79–97)
PLATELET # BLD AUTO: 174 X10E3/UL (ref 150–450)
POTASSIUM SERPL-SCNC: 4.6 MMOL/L (ref 3.5–5.2)
PROT SERPL-MCNC: 6.9 G/DL (ref 6–8.5)
RBC # BLD AUTO: 5.42 X10E6/UL (ref 3.77–5.28)
SODIUM SERPL-SCNC: 138 MMOL/L (ref 134–144)
TESTOST SERPL-MCNC: 342 NG/DL (ref 8–60)
TRIGL SERPL-MCNC: 232 MG/DL (ref 0–149)
VLDLC SERPL CALC-MCNC: 43 MG/DL (ref 5–40)
WBC # BLD AUTO: 7.4 X10E3/UL (ref 3.4–10.8)

## 2024-05-06 ENCOUNTER — PRIOR AUTHORIZATION (OUTPATIENT)
Dept: FAMILY MEDICINE CLINIC | Facility: CLINIC | Age: 34
End: 2024-05-06
Payer: COMMERCIAL

## 2024-05-06 DIAGNOSIS — E11.9 TYPE 2 DIABETES MELLITUS WITHOUT COMPLICATION, WITHOUT LONG-TERM CURRENT USE OF INSULIN: Primary | ICD-10-CM

## 2024-06-06 DIAGNOSIS — E34.9 HORMONE IMBALANCE: ICD-10-CM

## 2024-06-06 DIAGNOSIS — F64.0 GENDER DYSPHORIA IN ADULT: ICD-10-CM

## 2024-06-06 RX ORDER — TESTOSTERONE CYPIONATE 200 MG/ML
INJECTION, SOLUTION INTRAMUSCULAR
Qty: 10 ML | Refills: 1 | Status: SHIPPED | OUTPATIENT
Start: 2024-06-06

## 2024-06-11 DIAGNOSIS — F33.1 MAJOR DEPRESSIVE DISORDER, RECURRENT EPISODE, MODERATE DEGREE: ICD-10-CM

## 2024-06-11 DIAGNOSIS — F51.04 PSYCHOPHYSIOLOGICAL INSOMNIA: ICD-10-CM

## 2024-06-11 DIAGNOSIS — F41.1 GENERALIZED ANXIETY DISORDER: ICD-10-CM

## 2024-06-12 RX ORDER — SERTRALINE HYDROCHLORIDE 100 MG/1
100 TABLET, FILM COATED ORAL DAILY
Qty: 90 TABLET | Refills: 3 | Status: SHIPPED | OUTPATIENT
Start: 2024-06-12

## 2024-06-12 RX ORDER — PRAZOSIN HYDROCHLORIDE 2 MG/1
2 CAPSULE ORAL NIGHTLY
Qty: 90 CAPSULE | Refills: 3 | Status: SHIPPED | OUTPATIENT
Start: 2024-06-12

## 2024-06-12 RX ORDER — TRAZODONE HYDROCHLORIDE 100 MG/1
100 TABLET ORAL NIGHTLY
Qty: 90 TABLET | Refills: 3 | Status: SHIPPED | OUTPATIENT
Start: 2024-06-12

## 2024-06-12 RX ORDER — BUPROPION HYDROCHLORIDE 150 MG/1
150 TABLET ORAL EVERY MORNING
Qty: 90 TABLET | Refills: 3 | Status: SHIPPED | OUTPATIENT
Start: 2024-06-12

## 2024-07-01 ENCOUNTER — OFFICE VISIT (OUTPATIENT)
Dept: FAMILY MEDICINE CLINIC | Facility: CLINIC | Age: 34
End: 2024-07-01
Payer: COMMERCIAL

## 2024-07-01 VITALS
DIASTOLIC BLOOD PRESSURE: 80 MMHG | SYSTOLIC BLOOD PRESSURE: 132 MMHG | BODY MASS INDEX: 45.99 KG/M2 | HEIGHT: 67 IN | WEIGHT: 293 LBS | RESPIRATION RATE: 16 BRPM | OXYGEN SATURATION: 97 % | HEART RATE: 75 BPM

## 2024-07-01 DIAGNOSIS — F90.9 ADULT ATTENTION DEFICIT HYPERACTIVITY DISORDER: Primary | ICD-10-CM

## 2024-07-01 PROCEDURE — 99213 OFFICE O/P EST LOW 20 MIN: CPT | Performed by: FAMILY MEDICINE

## 2024-07-01 RX ORDER — DEXTROAMPHETAMINE SACCHARATE, AMPHETAMINE ASPARTATE MONOHYDRATE, DEXTROAMPHETAMINE SULFATE AND AMPHETAMINE SULFATE 2.5; 2.5; 2.5; 2.5 MG/1; MG/1; MG/1; MG/1
10 CAPSULE, EXTENDED RELEASE ORAL EVERY MORNING
Qty: 30 CAPSULE | Refills: 0 | Status: SHIPPED | OUTPATIENT
Start: 2024-07-01

## 2024-07-03 DIAGNOSIS — E11.9 TYPE 2 DIABETES MELLITUS WITHOUT COMPLICATION, WITHOUT LONG-TERM CURRENT USE OF INSULIN: ICD-10-CM

## 2024-07-03 RX ORDER — TIRZEPATIDE 2.5 MG/.5ML
INJECTION, SOLUTION SUBCUTANEOUS
Qty: 0.5 ML | Refills: 1 | Status: SHIPPED | OUTPATIENT
Start: 2024-07-03

## 2024-08-12 DIAGNOSIS — F90.9 ADULT ATTENTION DEFICIT HYPERACTIVITY DISORDER: ICD-10-CM

## 2024-08-12 RX ORDER — DEXTROAMPHETAMINE SACCHARATE, AMPHETAMINE ASPARTATE MONOHYDRATE, DEXTROAMPHETAMINE SULFATE AND AMPHETAMINE SULFATE 3.75; 3.75; 3.75; 3.75 MG/1; MG/1; MG/1; MG/1
15 CAPSULE, EXTENDED RELEASE ORAL EVERY MORNING
Qty: 30 CAPSULE | Refills: 0 | Status: SHIPPED | OUTPATIENT
Start: 2024-08-12

## 2024-10-30 RX ORDER — TRIAMCINOLONE ACETONIDE 1 MG/G
1 OINTMENT TOPICAL 2 TIMES DAILY
Qty: 30 G | Refills: 1 | Status: SHIPPED | OUTPATIENT
Start: 2024-10-30

## 2024-10-30 RX ORDER — TRIAMCINOLONE ACETONIDE 1 MG/G
1 OINTMENT TOPICAL 2 TIMES DAILY
Qty: 30 G | Refills: 1 | Status: SHIPPED | OUTPATIENT
Start: 2024-10-30 | End: 2024-10-30 | Stop reason: SDUPTHER

## 2024-11-20 ENCOUNTER — OFFICE VISIT (OUTPATIENT)
Dept: FAMILY MEDICINE CLINIC | Facility: CLINIC | Age: 34
End: 2024-11-20
Payer: COMMERCIAL

## 2024-11-20 VITALS
BODY MASS INDEX: 45.99 KG/M2 | DIASTOLIC BLOOD PRESSURE: 90 MMHG | OXYGEN SATURATION: 97 % | WEIGHT: 293 LBS | HEIGHT: 67 IN | RESPIRATION RATE: 18 BRPM | SYSTOLIC BLOOD PRESSURE: 132 MMHG | HEART RATE: 72 BPM

## 2024-11-20 DIAGNOSIS — G44.209 TENSION HEADACHE: ICD-10-CM

## 2024-11-20 DIAGNOSIS — L42 PITYRIASIS ROSEA: ICD-10-CM

## 2024-11-20 DIAGNOSIS — L03.811 CELLULITIS OF HEAD EXCEPT FACE: Primary | ICD-10-CM

## 2024-11-20 PROCEDURE — 99214 OFFICE O/P EST MOD 30 MIN: CPT | Performed by: FAMILY MEDICINE

## 2024-11-20 RX ORDER — CYCLOBENZAPRINE HCL 5 MG
5 TABLET ORAL 3 TIMES DAILY PRN
Qty: 30 TABLET | Refills: 2 | Status: SHIPPED | OUTPATIENT
Start: 2024-11-20

## 2024-11-20 RX ORDER — MUPIROCIN 20 MG/G
1 OINTMENT TOPICAL 3 TIMES DAILY
Qty: 30 G | Refills: 0 | Status: SHIPPED | OUTPATIENT
Start: 2024-11-20

## 2024-12-19 DIAGNOSIS — E11.9 TYPE 2 DIABETES MELLITUS WITHOUT COMPLICATION, WITHOUT LONG-TERM CURRENT USE OF INSULIN: Primary | ICD-10-CM

## 2024-12-19 DIAGNOSIS — F90.9 ADULT ATTENTION DEFICIT HYPERACTIVITY DISORDER: Primary | ICD-10-CM

## 2024-12-19 RX ORDER — DEXTROAMPHETAMINE SACCHARATE, AMPHETAMINE ASPARTATE MONOHYDRATE, DEXTROAMPHETAMINE SULFATE AND AMPHETAMINE SULFATE 5; 5; 5; 5 MG/1; MG/1; MG/1; MG/1
20 CAPSULE, EXTENDED RELEASE ORAL EVERY MORNING
Qty: 30 CAPSULE | Refills: 0 | Status: SHIPPED | OUTPATIENT
Start: 2024-12-19

## 2025-01-24 DIAGNOSIS — F90.9 ADULT ATTENTION DEFICIT HYPERACTIVITY DISORDER: ICD-10-CM

## 2025-01-24 RX ORDER — DEXTROAMPHETAMINE SACCHARATE, AMPHETAMINE ASPARTATE MONOHYDRATE, DEXTROAMPHETAMINE SULFATE AND AMPHETAMINE SULFATE 5; 5; 5; 5 MG/1; MG/1; MG/1; MG/1
20 CAPSULE, EXTENDED RELEASE ORAL EVERY MORNING
Qty: 90 CAPSULE | Refills: 0 | Status: SHIPPED | OUTPATIENT
Start: 2025-01-24

## 2025-03-11 ENCOUNTER — OFFICE VISIT (OUTPATIENT)
Dept: FAMILY MEDICINE CLINIC | Facility: CLINIC | Age: 35
End: 2025-03-11
Payer: COMMERCIAL

## 2025-03-11 VITALS
HEIGHT: 67 IN | DIASTOLIC BLOOD PRESSURE: 88 MMHG | HEART RATE: 72 BPM | OXYGEN SATURATION: 98 % | WEIGHT: 293 LBS | SYSTOLIC BLOOD PRESSURE: 140 MMHG | RESPIRATION RATE: 20 BRPM | BODY MASS INDEX: 45.99 KG/M2

## 2025-03-11 DIAGNOSIS — L40.0 PLAQUE PSORIASIS: Primary | ICD-10-CM

## 2025-03-11 PROCEDURE — 99213 OFFICE O/P EST LOW 20 MIN: CPT | Performed by: FAMILY MEDICINE

## 2025-03-11 RX ORDER — TRIAMCINOLONE ACETONIDE 5 MG/G
1 OINTMENT TOPICAL 2 TIMES DAILY
Qty: 30 G | Refills: 5 | Status: SHIPPED | OUTPATIENT
Start: 2025-03-11

## 2025-03-11 NOTE — PROGRESS NOTES
"Chief Complaint  Rash (Painful itchy had since November )    Subjective    History of Present Illness  History of Present Illness  The patient presents for evaluation of a persistent rash.    He has been experiencing a persistent rash since 10/2024, which is located on the posterior aspect of his cranium and on his abdomen. The rash is characterized by severe itching and discomfort, leading to constant head movement to avoid contact with his pillow due to associated pain. He has not sought dermatological consultation for this condition. He suspects it might be psoriasis because when he scratches it, there is a little flash of blood, but it does not bleed. He was previously treated with mupirocin which did not have any noticeable effect.      Objective     Vital Signs:   /88   Pulse 72   Resp 20   Ht 170.2 cm (67\")   Wt (!) 156 kg (345 lb)   SpO2 98%   BMI 54.03 kg/m²   Physical Exam  Vitals and nursing note reviewed.   Constitutional:       General: He is not in acute distress.     Appearance: Normal appearance. He is not ill-appearing.   Cardiovascular:      Rate and Rhythm: Normal rate and regular rhythm.      Pulses: Normal pulses.      Heart sounds: Normal heart sounds. No murmur heard.  Skin:            Comments: Multiple broad-based erythematous plaques with overlying scale, appearance consistent with psoriasis.   Neurological:      Mental Status: He is alert.                           Assessment and Plan   Diagnoses and all orders for this visit:    1. Plaque psoriasis (Primary)  -     Ambulatory Referral to Dermatology  -     triamcinolone (KENALOG) 0.5 % ointment; Apply 1 Application topically to the appropriate area as directed 2 (Two) Times a Day.  Dispense: 30 g; Refill: 5      Assessment & Plan  1. Psoriasis.  The patient's symptoms, including persistent rash, itching, and discomfort, are consistent with psoriasis. A prescription for a potent topical steroid, triamcinolone, has been provided " to be applied twice daily on the affected areas. He has been advised to inform us if the medication proves to be excessively costly, so alternative treatments can be considered. A referral to a dermatologist at [facility] [place] has been initiated. He should expect a call from the dermatologist's office within a week. If no contact is made, he is encouraged to reach out to them.    Recommended follow up as below. Encouraged communication via [employer] in the meantime.     Patient was given instructions and counseling regarding his condition or for health maintenance advice. Please see specific information pulled into the AVS (placed there by myself) if appropriate.    Return in about 1 month (around 4/11/2025), or if symptoms worsen or fail to improve, for f/u gender-affirming hormone therapy.    Patient or patient representative verbalized consent for the use of Ambient Listening during the visit with  Tyrone Barber MD for chart documentation. 3/11/2025  08:48 TIFFANIT    AARON Barber MD

## 2025-04-02 DIAGNOSIS — F33.1 MAJOR DEPRESSIVE DISORDER, RECURRENT EPISODE, MODERATE DEGREE: ICD-10-CM

## 2025-04-02 DIAGNOSIS — F41.1 GENERALIZED ANXIETY DISORDER: ICD-10-CM

## 2025-04-02 RX ORDER — HYDROXYZINE HYDROCHLORIDE 50 MG/1
50 TABLET, FILM COATED ORAL 2 TIMES DAILY PRN
Qty: 180 TABLET | Refills: 0 | Status: SHIPPED | OUTPATIENT
Start: 2025-04-02

## 2025-04-02 NOTE — TELEPHONE ENCOUNTER
LAST REFILL - 04/30/24  LAST VISIT - 03/11/25  NEXT VISIT - 04/15/25    Routing to covering provider Delma Cox per PCP being out of office.

## 2025-04-15 ENCOUNTER — OFFICE VISIT (OUTPATIENT)
Dept: FAMILY MEDICINE CLINIC | Facility: CLINIC | Age: 35
End: 2025-04-15
Payer: COMMERCIAL

## 2025-04-15 VITALS
BODY MASS INDEX: 45.99 KG/M2 | HEART RATE: 73 BPM | OXYGEN SATURATION: 98 % | HEIGHT: 67 IN | RESPIRATION RATE: 16 BRPM | SYSTOLIC BLOOD PRESSURE: 134 MMHG | DIASTOLIC BLOOD PRESSURE: 88 MMHG | WEIGHT: 293 LBS

## 2025-04-15 DIAGNOSIS — L40.0 PLAQUE PSORIASIS: ICD-10-CM

## 2025-04-15 DIAGNOSIS — F64.0 GENDER DYSPHORIA IN ADULT: ICD-10-CM

## 2025-04-15 DIAGNOSIS — Z11.1 SCREENING-PULMONARY TB: ICD-10-CM

## 2025-04-15 DIAGNOSIS — E34.9 HORMONE IMBALANCE: ICD-10-CM

## 2025-04-15 DIAGNOSIS — Z13.6 ENCOUNTER FOR LIPID SCREENING FOR CARDIOVASCULAR DISEASE: ICD-10-CM

## 2025-04-15 DIAGNOSIS — Z00.00 ROUTINE HEALTH MAINTENANCE: Primary | ICD-10-CM

## 2025-04-15 DIAGNOSIS — E66.813 CLASS 3 SEVERE OBESITY DUE TO EXCESS CALORIES WITHOUT SERIOUS COMORBIDITY WITH BODY MASS INDEX (BMI) OF 50.0 TO 59.9 IN ADULT: ICD-10-CM

## 2025-04-15 DIAGNOSIS — M62.838 NECK MUSCLE SPASM: ICD-10-CM

## 2025-04-15 DIAGNOSIS — E11.9 TYPE 2 DIABETES MELLITUS WITHOUT COMPLICATION, WITHOUT LONG-TERM CURRENT USE OF INSULIN: ICD-10-CM

## 2025-04-15 DIAGNOSIS — Z51.81 THERAPEUTIC DRUG MONITORING: ICD-10-CM

## 2025-04-15 DIAGNOSIS — E66.01 CLASS 3 SEVERE OBESITY DUE TO EXCESS CALORIES WITHOUT SERIOUS COMORBIDITY WITH BODY MASS INDEX (BMI) OF 50.0 TO 59.9 IN ADULT: ICD-10-CM

## 2025-04-15 DIAGNOSIS — Z13.220 ENCOUNTER FOR LIPID SCREENING FOR CARDIOVASCULAR DISEASE: ICD-10-CM

## 2025-04-15 PROBLEM — I10 ESSENTIAL HYPERTENSION: Status: ACTIVE | Noted: 2025-04-15

## 2025-04-15 LAB
CHOLEST SERPL-MCNC: 263 MG/DL (ref 0–200)
HBA1C MFR BLD: 6.5 % (ref 4.8–5.6)
HDLC SERPL-MCNC: 37 MG/DL (ref 40–60)
LDLC SERPL CALC-MCNC: 187 MG/DL (ref 0–100)
LDLC/HDLC SERPL: 5.01 {RATIO}
TRIGL SERPL-MCNC: 204 MG/DL (ref 0–150)
VLDLC SERPL CALC-MCNC: 39 MG/DL (ref 5–40)

## 2025-04-15 RX ORDER — CLOBETASOL PROPIONATE 0.5 MG/G
CREAM TOPICAL
COMMUNITY
Start: 2025-03-26

## 2025-04-15 RX ORDER — TACROLIMUS 1 MG/G
OINTMENT TOPICAL
COMMUNITY
Start: 2025-03-26

## 2025-04-15 NOTE — PATIENT INSTRUCTIONS
Mindfulness apps: Headspace, Smiling Mind, Lowell!    Mindfulness-Based Stress Reduction  Mindfulness-based stress reduction (MBSR) is a program that helps people learn to practice mindfulness. Mindfulness is the practice of intentionally paying attention to the present moment. It can be learned and practiced through techniques such as education, breathing exercises, meditation, and yoga. MBSR includes several mindfulness techniques in one program.  MBSR works best when you understand the treatment, are willing to try new things, and can commit to spending time practicing what you learn. MBSR training may include learning about:  How your emotions, thoughts, and reactions affect your body.  New ways to respond to things that cause negative thoughts to start (triggers).  How to notice your thoughts and let go of them.  Practicing awareness of everyday things that you normally do without thinking.  The techniques and goals of different types of meditation.  What are the benefits of MBSR?  MBSR can have many benefits, which include helping you to:  Develop self-awareness. This refers to knowing and understanding yourself.  Learn skills and attitudes that help you to participate in your own health care.  Learn new ways to care for yourself.  Be more accepting about how things are, and let things go.  Be less judgmental and approach things with an open mind.  Be patient with yourself and trust yourself more.  MBSR has also been shown to:  Reduce negative emotions, such as depression and anxiety.  Improve memory and focus.  Change how you sense and approach pain.  Boost your body's ability to fight infections.  Help you connect better with other people.  Improve your sense of well-being.  Follow these instructions at home:    Find a local in-person or online MBSR program.  Set aside some time regularly for mindfulness practice.  Find a mindfulness practice that works best for you. This may include one or more of the  following:  Meditation. Meditation involves focusing your mind on a certain thought or activity.  Breathing awareness exercises. These help you to stay present by focusing on your breath.  Body scan. For this practice, you lie down and pay attention to each part of your body from head to toe. You can identify tension and soreness and intentionally relax parts of your body.  Yoga. Yoga involves stretching and breathing, and it can improve your ability to move and be flexible. It can also provide an experience of testing your body's limits, which can help you release stress.  Mindful eating. This way of eating involves focusing on the taste, texture, color, and smell of each bite of food. Because this slows down eating and helps you feel full sooner, it can be an important part of a weight-loss plan.  Find a podcast or recording that provides guidance for breathing awareness, body scan, or meditation exercises. You can listen to these any time when you have a free moment to rest without distractions.  Follow your treatment plan as told by your health care provider. This may include taking regular medicines and making changes to your diet or lifestyle as recommended.  How to practice mindfulness  To do a basic awareness exercise:  Find a comfortable place to sit.  Pay attention to the present moment. Observe your thoughts, feelings, and surroundings just as they are.  Avoid placing judgment on yourself, your feelings, or your surroundings. Make note of any judgment that comes up, and let it go.  Your mind may wander, and that is okay. Make note of when your thoughts drift, and return your attention to the present moment.  To do basic mindfulness meditation:  Find a comfortable place to sit. This may include a stable chair or a firm floor cushion.  Sit upright with your back straight. Let your arms fall next to your side with your hands resting on your legs.  If sitting in a chair, rest your feet flat on the floor.  If  sitting on a cushion, cross your legs in front of you.  Keep your head in a neutral position with your chin dropped slightly. Relax your jaw and rest the tip of your tongue on the roof of your mouth. Drop your gaze to the floor. You can close your eyes if you like.  Breathe normally and pay attention to your breath. Feel the air moving in and out of your nose. Feel your belly expanding and relaxing with each breath.  Your mind may wander, and that is okay. Make note of when your thoughts drift, and return your attention to your breath.  Avoid placing judgment on yourself, your feelings, or your surroundings. Make note of any judgment or feelings that come up, let them go, and bring your attention back to your breath.  When you are ready, lift your gaze or open your eyes. Pay attention to how your body feels after the meditation.  Where to find more information  You can find more information about MBSR from:  Your health care provider.  Community-based meditation centers or programs.  Programs offered near you.  Summary  Mindfulness-based stress reduction (MBSR) is a program that teaches you how to intentionally pay attention to the present moment. It is used with other treatments to help you cope better with daily stress, emotions, and pain.  MBSR focuses on developing self-awareness, which allows you to respond to life stress without judgment or negative emotions.  MBSR programs may involve learning different mindfulness practices, such as breathing exercises, meditation, yoga, body scan, or mindful eating. Find a mindfulness practice that works best for you, and set aside time for it on a regular basis.  This information is not intended to replace advice given to you by your health care provider. Make sure you discuss any questions you have with your health care provider.  Document Released: 04/26/2018 Document Revised: 11/30/2018 Document Reviewed: 04/26/2018  Elsevier Patient Education © 2020 [employer]

## 2025-04-15 NOTE — PROGRESS NOTES
Chief Complaint  Annual Exam and Mass (Right side of neck, painful when palpated or during mobility. Semi-soft, semi-hard per patient. Non-moving.)    Subjective    History of Present Illness  History of Present Illness  The patient presents for an annual exam, with concerns including diabetes mellitus, psoriasis, sleep issues, and a lump on the right side of the neck.    Psoriasis has been officially been diagnosed by dermatology, necessitating an annual TB blood test. Some spots in uncomfortable places are reported, including a scar in the groin area and new spots on the other side. A biopsy was performed 3 weeks ago. Currently working with dermatology to investigate possible interventions. Is looking at a monthly injectable option.    Mounjaro was discontinued due to persistent nausea for 1 to 3 hours post-administration. Currently, no medication is taken for diabetes. Approximately 8 to 10 pounds of weight loss is reported, with dietary changes including snack packs of apples, string cheese, fruits, and vegetables, and the elimination of diet sodas. Due for check of labs today. Hoping for improvements in A1c.    A palpable mass on the right side of the neck is reported, with pain during range of motion and increased tenderness upon palpation. No history of trauma or injury to the area is noted.    Continues on weekly testosterone injections for gender dysphoria. Reports good adherence and tolerance, no unwanted side effects. No problems with self-administration, no injection site reactions. Due for check of testosterone.    A pneumonia vaccine was received in 2019 due to asthma. The patient is a nonresponder to hepatitis B and has received vaccinations in the last two Octobers. An eye exam was conducted for a 's test. A Pap smear is due in June or July 2025. There is no interest in having more children.    Has good family and social support. Work is going reasonably well and stress is  "well-managed.      Objective     Vital Signs:   /88   Pulse 73   Resp 16   Ht 170.2 cm (67\")   Wt (!) 151 kg (333 lb 4.8 oz)   SpO2 98%   BMI 52.20 kg/m²   Physical Exam  Vitals and nursing note reviewed.   Constitutional:       General: He is not in acute distress.     Appearance: Normal appearance. He is not ill-appearing.   Neck:        Comments: Tenderness in the lateral right neck and medial shoulder, quite tender to palpation and worse with stretching. Exam consistent with muscle spasm.  Cardiovascular:      Rate and Rhythm: Normal rate and regular rhythm.      Pulses: Normal pulses.      Heart sounds: Normal heart sounds. No murmur heard.  Pulmonary:      Effort: Pulmonary effort is normal. No respiratory distress.      Breath sounds: Normal breath sounds. No rales.   Skin:     Comments: Multiple psoriatic plaques throughout.   Neurological:      Mental Status: He is alert and oriented to person, place, and time. Mental status is at baseline.   Psychiatric:         Mood and Affect: Mood normal.         Behavior: Behavior normal.          Assessment and Plan   Diagnoses and all orders for this visit:    1. Routine health maintenance (Primary)    2. Type 2 diabetes mellitus without complication, without long-term current use of insulin  -     Hemoglobin A1c  -     Microalbumin / Creatinine Urine Ratio - Urine, Clean Catch    3. Gender dysphoria in adult  -     Testosterone  -     Lipid Panel With LDL / HDL Ratio    4. Hormone imbalance  -     Testosterone  -     Lipid Panel With LDL / HDL Ratio    5. Plaque psoriasis  -     Cancel: HIV-1 RNA B-DNA Quant  -     QuantiFERON TB Gold    6. Neck muscle spasm    7. Screening-pulmonary TB  -     Cancel: HIV-1 RNA B-DNA Quant  -     QuantiFERON TB Gold    8. Encounter for lipid screening for cardiovascular disease  -     Lipid Panel With LDL / HDL Ratio    9. Therapeutic drug monitoring  -     Urine Drug Screen - Urine, Clean Catch    10. Class 3 severe " obesity due to excess calories without serious comorbidity with body mass index (BMI) of 50.0 to 59.9 in adult      Assessment & Plan  1. Health maintenance.  - The patient received a pneumonia vaccine in 2019 and will be due for another one at age 64.  - The patient is a non-responder to Hepatitis B and receives titers regularly.  - An eye exam is recommended due to diabetes.  - A Pap smear is due in June or July.  - Normal labs as above. I will contact him with results as available.    2. Psoriasis.  - A Quantiferon Gold test will be ordered in anticipation of potential treatment for psoriasis.  - Follow-up with dermatology as scheduled.    3. Gender dysphoria.  - Labs as above. I will contact him with results. Continue regimen as prescribed.    4. Neck muscle spasm.  - Recommended some home stretching. Recommended muscle cream. Anticipate resolution with time.    5. Diabetes mellitus.  - Laboratory tests will be conducted today to assess current glucose levels and overall metabolic status.  - We will discuss treatment measures as necessary.    Class 3 Severe Obesity (BMI >=40). Obesity-related health conditions include the following: diabetes mellitus. Obesity is improving with lifestyle modifications. BMI is is above average; BMI management plan is completed. We discussed portion control and increasing exercise.    Encouraged to continue working on healthy lifestyle habits. Recommended follow up as below. Encouraged communication via [employer] in the meantime.     Patient was given instructions and counseling regarding his condition or for health maintenance advice. Please see specific information pulled into the AVS (placed there by myself) if appropriate.    Return in about 6 months (around 10/15/2025), or if symptoms worsen or fail to improve.    Patient or patient representative verbalized consent for the use of Ambient Listening during the visit with  Tyrone Barber MD for chart documentation.  4/15/2025  08:48 EDT    AARON Barber MD    Prevention counseling performed as below: Mindfulness for stress management.

## 2025-04-16 LAB
ALBUMIN/CREAT UR: 4 MG/G CREAT (ref 0–29)
AMPHETAMINES UR QL SCN: NEGATIVE NG/ML
BARBITURATES UR QL SCN: NEGATIVE NG/ML
BENZODIAZ UR QL SCN: NEGATIVE NG/ML
BZE UR QL SCN: NEGATIVE NG/ML
CANNABINOIDS UR QL SCN: POSITIVE NG/ML
CREAT UR-MCNC: 240.3 MG/DL (ref 20–300)
CREAT UR-MCNC: 266.5 MG/DL
LABORATORY COMMENT REPORT: ABNORMAL
METHADONE UR QL SCN: NEGATIVE NG/ML
MICROALBUMIN UR-MCNC: 10.6 UG/ML
OPIATES UR QL SCN: NEGATIVE NG/ML
OXYCODONE+OXYMORPHONE UR QL SCN: NEGATIVE NG/ML
PCP UR QL: NEGATIVE NG/ML
PH UR: 5.3 [PH] (ref 4.5–8.9)
PROPOXYPH UR QL SCN: NEGATIVE NG/ML
TESTOST SERPL-MCNC: 82 NG/DL (ref 8–60)

## 2025-04-16 RX ORDER — ROSUVASTATIN CALCIUM 10 MG/1
10 TABLET, COATED ORAL DAILY
Qty: 90 TABLET | Refills: 3 | Status: SHIPPED | OUTPATIENT
Start: 2025-04-16

## 2025-04-17 LAB
GAMMA INTERFERON BACKGROUND BLD IA-ACNC: 0.01 IU/ML
M TB IFN-G BLD-IMP: NEGATIVE
M TB IFN-G CD4+ BCKGRND COR BLD-ACNC: 0.04 IU/ML
M TB IFN-G CD4+CD8+ BCKGRND COR BLD-ACNC: 0.03 IU/ML
MITOGEN IGNF BCKGRD COR BLD-ACNC: >10 IU/ML
QUANTIFERON INCUBATION: NORMAL
SERVICE CMNT-IMP: NORMAL

## 2025-04-24 DIAGNOSIS — F90.9 ADULT ATTENTION DEFICIT HYPERACTIVITY DISORDER: ICD-10-CM

## 2025-04-24 RX ORDER — DEXTROAMPHETAMINE SACCHARATE, AMPHETAMINE ASPARTATE MONOHYDRATE, DEXTROAMPHETAMINE SULFATE AND AMPHETAMINE SULFATE 5; 5; 5; 5 MG/1; MG/1; MG/1; MG/1
20 CAPSULE, EXTENDED RELEASE ORAL EVERY MORNING
Qty: 90 CAPSULE | Refills: 0 | Status: SHIPPED | OUTPATIENT
Start: 2025-04-24

## 2025-06-16 DIAGNOSIS — F41.1 GENERALIZED ANXIETY DISORDER: ICD-10-CM

## 2025-06-16 DIAGNOSIS — F51.04 PSYCHOPHYSIOLOGICAL INSOMNIA: ICD-10-CM

## 2025-06-16 DIAGNOSIS — F33.1 MAJOR DEPRESSIVE DISORDER, RECURRENT EPISODE, MODERATE DEGREE: ICD-10-CM

## 2025-06-16 RX ORDER — TRAZODONE HYDROCHLORIDE 100 MG/1
100 TABLET ORAL NIGHTLY
Qty: 90 TABLET | Refills: 3 | Status: SHIPPED | OUTPATIENT
Start: 2025-06-16

## 2025-06-16 RX ORDER — SERTRALINE HYDROCHLORIDE 100 MG/1
100 TABLET, FILM COATED ORAL DAILY
Qty: 90 TABLET | Refills: 3 | Status: SHIPPED | OUTPATIENT
Start: 2025-06-16

## 2025-06-16 RX ORDER — PRAZOSIN HYDROCHLORIDE 2 MG/1
2 CAPSULE ORAL NIGHTLY
Qty: 90 CAPSULE | Refills: 3 | Status: SHIPPED | OUTPATIENT
Start: 2025-06-16

## 2025-06-16 RX ORDER — BUPROPION HYDROCHLORIDE 150 MG/1
150 TABLET ORAL EVERY MORNING
Qty: 90 TABLET | Refills: 3 | Status: SHIPPED | OUTPATIENT
Start: 2025-06-16

## 2025-06-16 NOTE — TELEPHONE ENCOUNTER
LAST REFILL - 06/12/24  LAST VISIT - 04/15/25  NEXT VISIT - 10/15/25    All meds met protocol for MA to fill, except prazosin. MA filled sertraline, trazodone, and bupropion. Please advise on prazosin refill.

## 2025-07-01 DIAGNOSIS — F33.1 MAJOR DEPRESSIVE DISORDER, RECURRENT EPISODE, MODERATE DEGREE: ICD-10-CM

## 2025-07-01 DIAGNOSIS — F41.1 GENERALIZED ANXIETY DISORDER: ICD-10-CM

## 2025-07-01 RX ORDER — HYDROXYZINE HYDROCHLORIDE 50 MG/1
50 TABLET, FILM COATED ORAL 2 TIMES DAILY PRN
Qty: 180 TABLET | Refills: 0 | Status: SHIPPED | OUTPATIENT
Start: 2025-07-01

## 2025-07-01 NOTE — TELEPHONE ENCOUNTER
LAST REFILL - 04/02/25  LAST VISIT - 04/15/25  NEXT VISIT - 10/15/25    Routing to covering provider per PCP being out of office. Please advise.

## 2025-07-24 DIAGNOSIS — F90.9 ADULT ATTENTION DEFICIT HYPERACTIVITY DISORDER: ICD-10-CM

## 2025-07-24 RX ORDER — DEXTROAMPHETAMINE SACCHARATE, AMPHETAMINE ASPARTATE MONOHYDRATE, DEXTROAMPHETAMINE SULFATE AND AMPHETAMINE SULFATE 5; 5; 5; 5 MG/1; MG/1; MG/1; MG/1
20 CAPSULE, EXTENDED RELEASE ORAL EVERY MORNING
Qty: 90 CAPSULE | Refills: 0 | Status: SHIPPED | OUTPATIENT
Start: 2025-07-24